# Patient Record
Sex: FEMALE | Race: WHITE | NOT HISPANIC OR LATINO | Employment: FULL TIME | URBAN - NONMETROPOLITAN AREA
[De-identification: names, ages, dates, MRNs, and addresses within clinical notes are randomized per-mention and may not be internally consistent; named-entity substitution may affect disease eponyms.]

---

## 2021-04-20 ENCOUNTER — IMMUNIZATION (OUTPATIENT)
Dept: VACCINE CLINIC | Facility: HOSPITAL | Age: 26
End: 2021-04-20

## 2021-04-20 PROCEDURE — 0001A: CPT | Performed by: INTERNAL MEDICINE

## 2021-04-20 PROCEDURE — 91300 HC SARSCOV02 VAC 30MCG/0.3ML IM: CPT | Performed by: INTERNAL MEDICINE

## 2021-05-18 ENCOUNTER — IMMUNIZATION (OUTPATIENT)
Dept: VACCINE CLINIC | Facility: HOSPITAL | Age: 26
End: 2021-05-18

## 2021-05-18 PROCEDURE — 91300 HC SARSCOV02 VAC 30MCG/0.3ML IM: CPT | Performed by: INTERNAL MEDICINE

## 2021-05-18 PROCEDURE — 0002A: CPT | Performed by: INTERNAL MEDICINE

## 2021-10-27 ENCOUNTER — OFFICE VISIT (OUTPATIENT)
Dept: OBSTETRICS AND GYNECOLOGY | Facility: CLINIC | Age: 26
End: 2021-10-27

## 2021-10-27 VITALS
HEIGHT: 65 IN | WEIGHT: 161 LBS | DIASTOLIC BLOOD PRESSURE: 100 MMHG | SYSTOLIC BLOOD PRESSURE: 142 MMHG | BODY MASS INDEX: 26.82 KG/M2

## 2021-10-27 DIAGNOSIS — Z30.014 ENCOUNTER FOR INITIAL PRESCRIPTION OF INTRAUTERINE CONTRACEPTIVE DEVICE (IUD): ICD-10-CM

## 2021-10-27 DIAGNOSIS — Z30.09 GENERAL COUNSELING AND ADVICE ON FEMALE CONTRACEPTION: Primary | ICD-10-CM

## 2021-10-27 PROCEDURE — 99203 OFFICE O/P NEW LOW 30 MIN: CPT | Performed by: OBSTETRICS & GYNECOLOGY

## 2021-10-27 RX ORDER — ETHINYL ESTRADIOL AND LEVONORGESTREL 150-30(84)
1 KIT ORAL DAILY
COMMUNITY
Start: 2021-08-01 | End: 2021-12-28

## 2021-10-31 NOTE — PROGRESS NOTES
"GYN Office Visit    Subjective   Chief Complaint   Patient presents with   • Gynecologic Exam     Last pap  WNL, currently on OCPs but wants Paraguard.     Buffy Cazares is a 26 y.o. year old  presenting to be seen for counseling regarding contraception.    She is currently using OCPs.  She would like to discuss birth control options, specifically IUDs.  She has regular, monthly menses that is not overly heavy.    OB Hx: Nulliparous  Pap smear:   Mammogram: Never  Colonoscopy: Never  DEXA Scan: Never    Past Medical History:   Diagnosis Date   • Urinary tract infection 2020       Past Surgical History:   Procedure Laterality Date   • APPENDECTOMY     • WISDOM TOOTH EXTRACTION         Family History   Problem Relation Age of Onset   • Breast cancer Paternal Grandmother    • Diabetes Paternal Grandmother         Social History     Tobacco Use   • Smoking status: Never Smoker   • Smokeless tobacco: Not on file   Vaping Use   • Vaping Use: Never used   Substance Use Topics   • Alcohol use: Yes     Alcohol/week: 2.0 standard drinks     Types: 2 Glasses of wine per week   • Drug use: Never       (Not in a hospital admission)      Amoxicillin-pot clavulanate    Current Outpatient Medications on File Prior to Visit   Medication Sig Dispense Refill   • Amethia 0.15-0.03 &0.01 MG tablet Take 1 tablet by mouth Daily.       No current facility-administered medications on file prior to visit.       Social History    Tobacco Use      Smoking status: Never Smoker      Smokeless tobacco: Not on file         Objective   /100   Ht 165.1 cm (65\")   Wt 73 kg (161 lb)   BMI 26.79 kg/m²     Physical Exam:  General Appearance: alert, pleasant, appears stated age, interactive and cooperative         Medical Decision Making:    Assessment   General counseling regarding contraception     Plan    No orders of the defined types were placed in this encounter.      Medication Management: Myrna " IUD    Procedures Performed: None    We reviewed various options for birth control today with emphasis on LARCs.  After reviewing the risks and benefits of various options, patient opted for the Kyleena IUD.  The device was ordered today and she will return in the coming weeks for placement.  Continue OCPs for now.  All questions answered.    Leo Wyatt MD  Obstetrics and Gynecology  [facility]

## 2021-11-19 ENCOUNTER — OFFICE VISIT (OUTPATIENT)
Dept: OBSTETRICS AND GYNECOLOGY | Facility: CLINIC | Age: 26
End: 2021-11-19

## 2021-11-19 VITALS
BODY MASS INDEX: 26.82 KG/M2 | DIASTOLIC BLOOD PRESSURE: 90 MMHG | SYSTOLIC BLOOD PRESSURE: 136 MMHG | WEIGHT: 161 LBS | HEIGHT: 65 IN

## 2021-11-19 DIAGNOSIS — Z30.430 ENCOUNTER FOR IUD INSERTION: Primary | ICD-10-CM

## 2021-11-19 PROBLEM — Z97.5 IUD (INTRAUTERINE DEVICE) IN PLACE: Status: ACTIVE | Noted: 2021-11-19

## 2021-11-19 LAB
B-HCG UR QL: NEGATIVE
EXPIRATION DATE: NORMAL
INTERNAL NEGATIVE CONTROL: NEGATIVE
INTERNAL POSITIVE CONTROL: POSITIVE
Lab: NORMAL

## 2021-11-19 PROCEDURE — 81025 URINE PREGNANCY TEST: CPT | Performed by: OBSTETRICS & GYNECOLOGY

## 2021-11-19 PROCEDURE — 58300 INSERT INTRAUTERINE DEVICE: CPT | Performed by: OBSTETRICS & GYNECOLOGY

## 2021-11-19 NOTE — PROGRESS NOTES
IUD Insertion    Patient's last menstrual period was 11/15/2021 (exact date).    Date of procedure:  11/19/2021    Risks and benefits discussed? yes  All questions answered? yes  Consents given by The patient  Written consent obtained? yes    Local anesthesia used:  no    Procedure documentation:    After verifying the patient had a low probability of being pregnant and met the criteria for insertion, a sterile speculum has placed and the cervix was cleansed with an antiseptic solution.  Vaginal discharge was scant.  The anterior lip of the cervix was grasped with a long Allis clamp and the uterine cavity was gently sounded. There was mild difficulty passing the sound through the cervix.  Cervical dilation did not need to be performed prior to placing the IUD.  The uterus was anteverted and sounded to 7 cms.  The Kyleena was then prepared per the manufacturers instructions.    The Kyleena was advanced to a point 2 cms from the fundus and then the arms were released from the sheath.  The device was advanced to the fundus and the device was released fully from the sheath.. The string was cut 5 cms in length.  Bleeding from the cervix was scant.    She tolerated the procedure without any difficulty.     Post procedure instructions: It was reviewed that the Kyleena will not alter the timing of when she bleeds but it may decrease the quantity of flow and cramps.  Roughly 30% of people will be amenorrheic over time.  Efficacy rate of 99.2% over 5 years was discussed.  Spontaneous expulsion rate of 1-2% was also discussed.  If she has any issue with fever or excessive bleeding or pain she is to call the office immediately.  Otherwise I would like to see her back in 5 weeks for f/u appt.    Leo Wyatt MD  Obstetrics and Gynecology  [facility]

## 2021-12-28 ENCOUNTER — OFFICE VISIT (OUTPATIENT)
Dept: OBSTETRICS AND GYNECOLOGY | Facility: CLINIC | Age: 26
End: 2021-12-28

## 2021-12-28 VITALS
WEIGHT: 161 LBS | SYSTOLIC BLOOD PRESSURE: 138 MMHG | HEIGHT: 65 IN | BODY MASS INDEX: 26.82 KG/M2 | DIASTOLIC BLOOD PRESSURE: 88 MMHG

## 2021-12-28 DIAGNOSIS — Z97.5 IUD (INTRAUTERINE DEVICE) IN PLACE: Primary | ICD-10-CM

## 2021-12-28 PROCEDURE — 99212 OFFICE O/P EST SF 10 MIN: CPT | Performed by: OBSTETRICS & GYNECOLOGY

## 2022-01-05 NOTE — PROGRESS NOTES
"IUD String Check    Chief Complaint   Patient presents with   • Follow-up     5 week IUD check, no complaints.       26 y.o.  female who presents for an IUD string check.    She has no complaints today. She has irregular, light bleeding. She has no pain symptoms.    Vitals:    21 1552   BP: 138/88   Weight: 73 kg (161 lb)   Height: 165.1 cm (65\")       PHYSICAL EXAM   General appearance: well nourished, well hydrated, no acute distress  Cervix: Normal appearance, IUD strings present    IMPRESSION/PLAN:    IUD in appropriate position    RTC as needed or for annual visits    Coding/billing based on time: 10 total minutes were required for this encounter.    Leo Wyatt MD  Obstetrics and Gynecology  [facility]    "

## 2022-05-12 ENCOUNTER — OFFICE VISIT (OUTPATIENT)
Dept: OBSTETRICS AND GYNECOLOGY | Facility: CLINIC | Age: 27
End: 2022-05-12

## 2022-05-12 VITALS
SYSTOLIC BLOOD PRESSURE: 122 MMHG | DIASTOLIC BLOOD PRESSURE: 74 MMHG | HEIGHT: 65 IN | BODY MASS INDEX: 26.66 KG/M2 | WEIGHT: 160 LBS

## 2022-05-12 DIAGNOSIS — Z97.5 IUD (INTRAUTERINE DEVICE) IN PLACE: ICD-10-CM

## 2022-05-12 DIAGNOSIS — N89.8 VAGINAL DISCHARGE: Primary | ICD-10-CM

## 2022-05-12 DIAGNOSIS — N89.8 VAGINAL PRURITUS: ICD-10-CM

## 2022-05-12 DIAGNOSIS — B37.31 VULVOVAGINAL CANDIDIASIS: ICD-10-CM

## 2022-05-12 DIAGNOSIS — N89.8 VAGINAL INCLUSION CYST: ICD-10-CM

## 2022-05-12 PROCEDURE — 99213 OFFICE O/P EST LOW 20 MIN: CPT | Performed by: OBSTETRICS & GYNECOLOGY

## 2022-05-12 PROCEDURE — 56501 DESTROY VULVA LESIONS SIM: CPT | Performed by: OBSTETRICS & GYNECOLOGY

## 2022-05-12 RX ORDER — FLUCONAZOLE 150 MG/1
TABLET ORAL
Qty: 2 TABLET | Refills: 0 | Status: SHIPPED | OUTPATIENT
Start: 2022-05-12 | End: 2022-07-26

## 2022-05-14 LAB
A VAGINAE DNA VAG QL NAA+PROBE: NORMAL SCORE
BVAB2 DNA VAG QL NAA+PROBE: NORMAL SCORE
C ALBICANS DNA VAG QL NAA+PROBE: NEGATIVE
C GLABRATA DNA VAG QL NAA+PROBE: NEGATIVE
C TRACH DNA VAG QL NAA+PROBE: NEGATIVE
MEGA1 DNA VAG QL NAA+PROBE: NORMAL SCORE
N GONORRHOEA DNA VAG QL NAA+PROBE: NEGATIVE
T VAGINALIS DNA VAG QL NAA+PROBE: NEGATIVE

## 2022-05-23 NOTE — PROGRESS NOTES
"GYN Office Visit    Subjective   Chief Complaint   Patient presents with   • Follow-up     Having problems since IUD was placed, states since insertion she is having itchy skin all over, right side pelvic pain, recurrent yeast infections.     Buffy Cazares is a 26 y.o. year old  presenting to be seen for vaginal discharge and pruritus.    She reports several weeks of recurrent yeast infections.  She has vaginal discharge and itching.  She complains of right lower quadrant pelvic pain.  Bleeding is irregular, but light.  She has noticed some type of cyst near the opening of the vagina.  No pain or signs of infection.    Her Kyleena IUD was placed 2021.    OB Hx: Nulliparous  Pap smear:     Past Medical History:   Diagnosis Date   • Urinary tract infection 2020       Past Surgical History:   Procedure Laterality Date   • APPENDECTOMY     • WISDOM TOOTH EXTRACTION         Family History   Problem Relation Age of Onset   • Breast cancer Paternal Grandmother    • Diabetes Paternal Grandmother         Social History     Tobacco Use   • Smoking status: Never Smoker   Vaping Use   • Vaping Use: Never used   Substance Use Topics   • Alcohol use: Yes     Alcohol/week: 2.0 standard drinks     Types: 2 Glasses of wine per week   • Drug use: Never       (Not in a hospital admission)      Amoxicillin-pot clavulanate    Current Outpatient Medications on File Prior to Visit   Medication Sig Dispense Refill   • levonorgestrel (KYLEENA) 19.5 MG intrauterine device IUD Take to prescribers office 1 each 0     No current facility-administered medications on file prior to visit.       Social History    Tobacco Use      Smoking status: Never Smoker      Smokeless tobacco: Not on file         Objective   /74   Ht 165.1 cm (65\")   Wt 72.6 kg (160 lb)   BMI 26.63 kg/m²     Physical Exam:  General Appearance: alert, pleasant, appears stated age, interactive and cooperative  Breasts: Not " performed.  Abdomen: no masses, no hepatomegaly, no splenomegaly, soft non-tender, no guarding and no rebound tenderness  Pelvis:  Pelvic: Clinical staff was present for exam  External genitalia:  normal appearance of the external genitalia including Bartholin's and Silkworth's glands.  :  urethral meatus normal;  Vagina:  normal pink mucosa without prolapse or lesions. discharge present -  white and thick; small, roughly 1 cm inclusion cyst noted at the vaginal introitus.  Cervix:  normal appearance. IUD string present  Uterus:  normal size, shape and consistency.  Adnexa:  normal bimanual exam of the adnexa.  Rectal:  digital rectal exam not performed; anus visually normal appearing.       Medical Decision Making:    Assessment   IUD in appropriate position  Vaginal inclusion cyst, drained  Vaginal discharge and pruritus  Vulvovaginal candidiasis     Plan    Orders Placed This Encounter   Procedures   • NuSwab VG+ - Swab, Cervix     Order Specific Question:   Release to patient     Answer:   Immediate     Order Specific Question:   [facility] Has the patient fasted?     Answer:   No       Medication Management: Diflucan    Procedures Performed: Drainage of vaginal inclusion cyst    We reviewed her situation in detail today.  Her IUD is in appropriate position.  Vaginal cultures were obtained today to rule out an infectious etiology for her symptoms.  Diflucan was prescribed for empiric treatment of yeast.  The vaginal inclusion cyst was cleaned with an alcohol wipe.  1% lidocaine with epinephrine was injected at the base of the cyst.  The cyst was incised with an 11 blade scalpel and the contents were drained.  No signs of infection.  Hemostasis was achieved with silver nitrate.  Patient tolerated the procedure well.  No complications.  Call/return precautions reviewed.    RTC in 12 months for annual visit + Pap smear.    Leo Wyatt MD  Obstetrics and Gynecology  [facility]

## 2022-07-26 ENCOUNTER — OFFICE VISIT (OUTPATIENT)
Dept: OBSTETRICS AND GYNECOLOGY | Facility: CLINIC | Age: 27
End: 2022-07-26

## 2022-07-26 VITALS
SYSTOLIC BLOOD PRESSURE: 130 MMHG | HEIGHT: 65 IN | DIASTOLIC BLOOD PRESSURE: 82 MMHG | BODY MASS INDEX: 27.49 KG/M2 | WEIGHT: 165 LBS

## 2022-07-26 DIAGNOSIS — Z30.011 VISIT FOR ORAL CONTRACEPTIVE PRESCRIPTION: ICD-10-CM

## 2022-07-26 DIAGNOSIS — Z30.432 ENCOUNTER FOR IUD REMOVAL: Primary | ICD-10-CM

## 2022-07-26 PROBLEM — Z97.5 IUD (INTRAUTERINE DEVICE) IN PLACE: Status: RESOLVED | Noted: 2021-11-19 | Resolved: 2022-07-26

## 2022-07-26 PROCEDURE — 58301 REMOVE INTRAUTERINE DEVICE: CPT | Performed by: OBSTETRICS & GYNECOLOGY

## 2022-07-26 RX ORDER — LEVONORGESTREL AND ETHINYL ESTRADIOL 0.15-0.03
1 KIT ORAL DAILY
Qty: 91 TABLET | Refills: 4 | Status: SHIPPED | OUTPATIENT
Start: 2022-07-26 | End: 2023-07-26

## 2022-07-26 NOTE — PROGRESS NOTES
IUD Removal    Date of procedure:  7/26/2022    Risks and benefits discussed? yes  All questions answered? yes  Consents given by The patient  Written consent obtained? yes  Reason for removal: Side effect: Pain and discharge    Local anesthesia used:  no    Procedure documentation:    A speculum was placed in order to view the cervix.  A tenaculum did not need to be placed on the anterior cervical lip.  Cervical dilation did not need to be performed in order to access the string.  The IUD string was easily seen.  The string was grasped and the IUD was removed without difficulty.  The IUD did appear to be mildly adherent to the uterine cavity. It was removed intact.    She tolerated the procedure without any difficulty.     Post procedure instructions: Patient notified to call with heavy bleeding, fever or increasing pain. Start continuous OCPs today.    Follow up as needed.    Leo Wyatt MD  Obstetrics and Gynecology  [facility]